# Patient Record
Sex: FEMALE | Race: WHITE
[De-identification: names, ages, dates, MRNs, and addresses within clinical notes are randomized per-mention and may not be internally consistent; named-entity substitution may affect disease eponyms.]

---

## 2017-02-02 ENCOUNTER — HOSPITAL ENCOUNTER (OUTPATIENT)
Dept: HOSPITAL 45 - C.LAB1850 | Age: 32
Discharge: HOME | End: 2017-02-02
Attending: INTERNAL MEDICINE
Payer: COMMERCIAL

## 2017-02-02 DIAGNOSIS — E03.9: Primary | ICD-10-CM

## 2017-02-02 LAB — TSH SERPL-ACNC: 1.27 UIU/ML (ref 0.3–4.5)

## 2017-06-05 ENCOUNTER — HOSPITAL ENCOUNTER (OUTPATIENT)
Dept: HOSPITAL 45 - C.LAB1850 | Age: 32
Discharge: HOME | End: 2017-06-05
Attending: PHYSICIAN ASSISTANT
Payer: COMMERCIAL

## 2017-06-05 DIAGNOSIS — E03.9: Primary | ICD-10-CM

## 2017-06-05 LAB — TSH SERPL-ACNC: 1.61 UIU/ML (ref 0.3–4.5)

## 2017-08-02 ENCOUNTER — HOSPITAL ENCOUNTER (OUTPATIENT)
Dept: HOSPITAL 45 - C.PAPS | Age: 32
Discharge: HOME | End: 2017-08-02
Attending: OBSTETRICS & GYNECOLOGY
Payer: COMMERCIAL

## 2017-08-02 DIAGNOSIS — Z12.4: Primary | ICD-10-CM

## 2017-08-17 ENCOUNTER — HOSPITAL ENCOUNTER (OUTPATIENT)
Dept: HOSPITAL 45 - C.LABPBG | Age: 32
Discharge: HOME | End: 2017-08-17
Attending: INTERNAL MEDICINE
Payer: COMMERCIAL

## 2017-08-17 DIAGNOSIS — E03.9: ICD-10-CM

## 2017-08-17 DIAGNOSIS — Z00.00: Primary | ICD-10-CM

## 2017-08-17 LAB
CHOLEST/HDLC SERPL: 3 {RATIO}
GLUCOSE UR QL: 48 MG/DL
KETONES UR QL STRIP: 71 MG/DL
NITRITE UR QL STRIP: 126 MG/DL (ref 0–150)
PH UR: 144 MG/DL (ref 0–200)
TSH SERPL-ACNC: 1.5 UIU/ML (ref 0.3–4.5)
VERY LOW DENSITY LIPOPROT CALC: 25 MG/DL

## 2017-11-29 ENCOUNTER — HOSPITAL ENCOUNTER (EMERGENCY)
Dept: HOSPITAL 45 - C.EDB | Age: 32
Discharge: HOME | End: 2017-11-29
Payer: COMMERCIAL

## 2017-11-29 VITALS — HEART RATE: 61 BPM | OXYGEN SATURATION: 96 % | DIASTOLIC BLOOD PRESSURE: 66 MMHG | SYSTOLIC BLOOD PRESSURE: 130 MMHG

## 2017-11-29 VITALS — TEMPERATURE: 98.06 F

## 2017-11-29 VITALS
BODY MASS INDEX: 46.63 KG/M2 | HEIGHT: 64.02 IN | HEIGHT: 64.02 IN | WEIGHT: 273.15 LBS | BODY MASS INDEX: 46.63 KG/M2 | WEIGHT: 273.15 LBS

## 2017-11-29 DIAGNOSIS — K21.9: ICD-10-CM

## 2017-11-29 DIAGNOSIS — Z90.49: ICD-10-CM

## 2017-11-29 DIAGNOSIS — Z82.49: ICD-10-CM

## 2017-11-29 DIAGNOSIS — F17.210: ICD-10-CM

## 2017-11-29 DIAGNOSIS — Z79.899: ICD-10-CM

## 2017-11-29 DIAGNOSIS — Z83.3: ICD-10-CM

## 2017-11-29 DIAGNOSIS — R10.32: Primary | ICD-10-CM

## 2017-11-29 DIAGNOSIS — Z80.9: ICD-10-CM

## 2017-11-29 DIAGNOSIS — Z82.0: ICD-10-CM

## 2017-11-29 LAB
ALBUMIN/GLOB SERPL: 0.7 {RATIO} (ref 0.9–2)
ALP SERPL-CCNC: 61 U/L (ref 45–117)
ALT SERPL-CCNC: 18 U/L (ref 12–78)
ANION GAP SERPL CALC-SCNC: 3 MMOL/L (ref 3–11)
APPEARANCE UR: CLEAR
AST SERPL-CCNC: 10 U/L (ref 15–37)
BASOPHILS # BLD: 0.05 K/UL (ref 0–0.2)
BASOPHILS NFR BLD: 0.4 %
BILIRUB UR-MCNC: (no result) MG/DL
BUN SERPL-MCNC: 8 MG/DL (ref 7–18)
BUN/CREAT SERPL: 13 (ref 10–20)
CALCIUM SERPL-MCNC: 8.9 MG/DL (ref 8.5–10.1)
CHLORIDE SERPL-SCNC: 105 MMOL/L (ref 98–107)
CO2 SERPL-SCNC: 27 MMOL/L (ref 21–32)
COLOR UR: YELLOW
COMPLETE: YES
CREAT CL PREDICTED SERPL C-G-VRATE: 162.6 ML/MIN
CREAT SERPL-MCNC: 0.64 MG/DL (ref 0.6–1.2)
EOSINOPHIL NFR BLD AUTO: 296 K/UL (ref 130–400)
GLOBULIN SER-MCNC: 4.6 GM/DL (ref 2.5–4)
GLUCOSE SERPL-MCNC: 84 MG/DL (ref 70–99)
HCT VFR BLD CALC: 41.7 % (ref 37–47)
IG%: 0.2 %
IMM GRANULOCYTES NFR BLD AUTO: 29.7 %
LYMPHOCYTES # BLD: 3.55 K/UL (ref 1.2–3.4)
MANUAL MICROSCOPIC REQUIRED?: NO
MCH RBC QN AUTO: 26.8 PG (ref 25–34)
MCHC RBC AUTO-ENTMCNC: 33.6 G/DL (ref 32–36)
MCV RBC AUTO: 79.7 FL (ref 80–100)
MONOCYTES NFR BLD: 4.9 %
NEUTROPHILS # BLD AUTO: 2.8 %
NEUTROPHILS NFR BLD AUTO: 62 %
NITRITE UR QL STRIP: (no result)
PH UR STRIP: 5 [PH] (ref 4.5–7.5)
PMV BLD AUTO: 8.6 FL (ref 7.4–10.4)
POTASSIUM SERPL-SCNC: 3.5 MMOL/L (ref 3.5–5.1)
PREG INTERNAL NEGATIVE QC: (no result)
PREG INTERNAL POSITIVE QC: (no result)
RBC # BLD AUTO: 5.23 M/UL (ref 4.2–5.4)
REVIEW REQ?: NO
SODIUM SERPL-SCNC: 135 MMOL/L (ref 136–145)
SP GR UR STRIP: 1.03 (ref 1–1.03)
URINE BILL WITH OR WITHOUT MIC: (no result)
URINE EPITHELIAL CELL AUTO: (no result) /LPF (ref 0–5)
UROBILINOGEN UR-MCNC: (no result) MG/DL
WBC # BLD AUTO: 11.95 K/UL (ref 4.8–10.8)

## 2017-11-29 NOTE — DIAGNOSTIC IMAGING REPORT
ABD/PELVIS WITHOUT FOR STONE



CT DOSE: 1701.36 mGy.cm



HISTORY: Flank pain  LLQ flank pain ? stone also hx diverticula



TECHNIQUE: Multiaxial CT images of the abdomen and pelvis were performed without

the use of intravenous and oral contrast according to the standard department

stone protocol.  A dose lowering technique was utilized adhering to the

principles of ALARA.



COMPARISON STUDY: 12/11/2015



FINDINGS: Lung bases are clear. Interval cholecystectomy. Liver spleen and

pancreas appear unremarkable. Left renal cyst unchanged in the prior exam.

Kidneys are considered negative for an obstructing urinary tract calculus.

Uterus is anteflexed. Bowel pattern is nonobstructive. No evidence for

diverticulitis. The appendix is normal.



IMPRESSION:  



1. Interval cholecystectomy.

2. No acute process of the abdomen or pelvis.

3. Normal appendix.

4. Stable left renal cyst 









The above report was generated using voice recognition software.  It may contain

grammatical, syntax or spelling errors.







Electronically signed by:  Zoran Garcia M.D.

11/29/2017 5:12 PM



Dictated Date/Time:  11/29/2017 5:08 PM

## 2017-11-29 NOTE — DIAGNOSTIC IMAGING REPORT
TRANSVAG-FEMALE PELVIS, PELVIC COMPLETE NON OB



CLINICAL HISTORY: 32 years-old Female presenting with LLQ pain, last menstrual

period 11/15/2017. 



TECHNIQUE: Real-time grayscale and color and spectral Doppler ultrasound imaging

of the pelvis was performed first using a transabdominal probe and subsequently

transvaginal for better characterization. 



COMPARISON: CT from 12/11/2015.



FINDINGS: 

The examination was limited by patient body habitus.



Uterus: Normal. Anteverted. The uterus measures 6.5 x 2.6 x 3.5 cm. Endometrial

stripe measures 6 mm in thickness. Endometrium normal-appearing. Cervix contains

nabothian cysts.



Right adnexa: Right ovary not visualized. 



Left adnexa: Left ovary grossly normal but poorly visualized. Left ovary

measures 1.9 x 1.9 x 1.0 cm. Normal arterial and venous waveforms within the

ovarian parenchyma.    



Other: No free fluid.



IMPRESSION:  

Limited examination secondary to poor sonographic penetration. Grossly normal

left ovary without evidence of left ovarian torsion. Nonvisualization of right

ovary.







Electronically signed by:  Chip Sousa M.D.

11/29/2017 4:03 PM



Dictated Date/Time:  11/29/2017 4:00 PM

## 2017-11-29 NOTE — DIAGNOSTIC IMAGING REPORT
KUB



CLINICAL HISTORY: 32 years-old Female presenting with L flank pain LLQ pain ?

stone, pelvic pain. 



TECHNIQUE: Single supine view of the abdomen was obtained.



COMPARISON: CT from 2015.



FINDINGS:

Cholecystectomy clips. Single pelvic surgical clip also noted. No gross

pneumoperitoneum. Nonobstructive bowel gas pattern.



No calcifications to suggest nephrolithiasis. Calcification in the left

hemipelvis may represent a phlebolith though no phlebolith was noted in 2015.



Osseous structures normal. Lung bases clear.



IMPRESSION:

1.  No radiographic evidence of renal calculi. However, calcification in the

left hemipelvis is noted. Distal left ureteral calculus cannot be excluded as no

pelvic phlebolith is noted on prior CT from 2015. If there is clinical concern,

further evaluation with noncontrast CT could be obtained.







Electronically signed by:  Chip Sousa M.D.

11/29/2017 2:50 PM



Dictated Date/Time:  11/29/2017 2:33 PM

## 2018-02-20 ENCOUNTER — HOSPITAL ENCOUNTER (OUTPATIENT)
Dept: HOSPITAL 45 - C.LABPBG | Age: 33
Discharge: HOME | End: 2018-02-20
Attending: PHYSICIAN ASSISTANT
Payer: COMMERCIAL

## 2018-02-20 DIAGNOSIS — E03.9: Primary | ICD-10-CM

## 2018-04-26 ENCOUNTER — HOSPITAL ENCOUNTER (OUTPATIENT)
Dept: HOSPITAL 45 - C.LAB1850 | Age: 33
Discharge: HOME | End: 2018-04-26
Attending: PHYSICIAN ASSISTANT
Payer: COMMERCIAL

## 2018-04-26 DIAGNOSIS — R19.7: Primary | ICD-10-CM

## 2018-05-09 ENCOUNTER — HOSPITAL ENCOUNTER (OUTPATIENT)
Dept: HOSPITAL 45 - C.LAB1850 | Age: 33
Discharge: HOME | End: 2018-05-09
Attending: PHYSICIAN ASSISTANT
Payer: COMMERCIAL

## 2018-05-09 DIAGNOSIS — E03.9: Primary | ICD-10-CM

## 2018-08-07 ENCOUNTER — HOSPITAL ENCOUNTER (OUTPATIENT)
Dept: HOSPITAL 45 - C.LABPBG | Age: 33
Discharge: HOME | End: 2018-08-07
Attending: INTERNAL MEDICINE
Payer: COMMERCIAL

## 2018-08-07 DIAGNOSIS — Z00.00: Primary | ICD-10-CM

## 2018-08-07 LAB
BUN SERPL-MCNC: 13 MG/DL (ref 7–18)
CALCIUM SERPL-MCNC: 9.1 MG/DL (ref 8.5–10.1)
CO2 SERPL-SCNC: 25 MMOL/L (ref 21–32)
CREAT SERPL-MCNC: 0.65 MG/DL (ref 0.6–1.2)
KETONES UR QL STRIP: 73 MG/DL
PH UR: 144 MG/DL (ref 0–200)
POTASSIUM SERPL-SCNC: 4 MMOL/L (ref 3.5–5.1)
SODIUM SERPL-SCNC: 139 MMOL/L (ref 136–145)